# Patient Record
Sex: MALE | Race: WHITE | NOT HISPANIC OR LATINO | Employment: FULL TIME | URBAN - METROPOLITAN AREA
[De-identification: names, ages, dates, MRNs, and addresses within clinical notes are randomized per-mention and may not be internally consistent; named-entity substitution may affect disease eponyms.]

---

## 2023-01-29 ENCOUNTER — HOSPITAL ENCOUNTER (EMERGENCY)
Facility: HOSPITAL | Age: 43
Discharge: HOME | End: 2023-01-29
Attending: EMERGENCY MEDICINE | Admitting: EMERGENCY MEDICINE
Payer: COMMERCIAL

## 2023-01-29 VITALS
RESPIRATION RATE: 18 BRPM | HEART RATE: 82 BPM | TEMPERATURE: 96.8 F | HEIGHT: 72 IN | OXYGEN SATURATION: 97 % | BODY MASS INDEX: 37.11 KG/M2 | SYSTOLIC BLOOD PRESSURE: 128 MMHG | DIASTOLIC BLOOD PRESSURE: 79 MMHG | WEIGHT: 274 LBS

## 2023-01-29 DIAGNOSIS — T15.91XS: ICD-10-CM

## 2023-01-29 PROCEDURE — 99283 EMERGENCY DEPT VISIT LOW MDM: CPT

## 2023-01-29 PROCEDURE — 250N000009 HC RX 250: Performed by: EMERGENCY MEDICINE

## 2023-01-29 RX ORDER — TETRACAINE HYDROCHLORIDE 5 MG/ML
1-2 SOLUTION OPHTHALMIC ONCE
Status: COMPLETED | OUTPATIENT
Start: 2023-01-29 | End: 2023-01-29

## 2023-01-29 RX ORDER — ERYTHROMYCIN 5 MG/G
OINTMENT OPHTHALMIC ONCE
Status: COMPLETED | OUTPATIENT
Start: 2023-01-29 | End: 2023-01-29

## 2023-01-29 RX ADMIN — ERYTHROMYCIN: 5 OINTMENT OPHTHALMIC at 21:46

## 2023-01-29 RX ADMIN — TETRACAINE HYDROCHLORIDE 2 DROP: 5 SOLUTION OPHTHALMIC at 20:37

## 2023-01-29 ASSESSMENT — ACTIVITIES OF DAILY LIVING (ADL): ADLS_ACUITY_SCORE: 35

## 2023-01-30 NOTE — ED PROVIDER NOTES
"EMERGENCY DEPARTMENT ENCOUNTER            IMPRESSION:  Orbital foreign body      MEDICAL DECISION MAKING:  Patient reports right eye foreign body.  He reports low velocity rust residue occurred last night    On exam he is vision is preserved.  He has some small flecks of albaro debris in his right eye.    The eye was anesthetized.  The eye was thoroughly irrigated.    There was still some small debris around the iris.  The eye was again anesthetized and this was removed with a Q-tip.    Erythromycin ointment was instilled in the eye    Patient discharged home      =================================================================  CHIEF COMPLAINT:  Chief Complaint   Patient presents with     Eye Problem     right         HPI  Marlo Miner is a 42 year old male with a history of impulse control disorder, DVT, and foreign body of skin of plantar aspect of foot, who presents to the ED by walk in for evaluation of eye problem.     Yesterday, the patient reports working on his car when some rust fell into his right eye. He was not wearing safety glasses. The patient states that he was able to wash his eyes with water afterwards, but still feels like \"something is there.\" He denies visual changes. Last night, the patient tried looking at his right eye with his iPhone and found \"4 pieces\" after taking a picture. Here in the ED, the patient reports of eye discomfort. He otherwise denies any other injuries or complaints at this time.     I, Vinny Ellis am serving as a scribe to document services personally performed by Dr. Frank Mckeon MD, based on my observation and the provider's statements to me. I, Dr. Frank Mckeon MD attest that Vinny Ellis is acting in a scribe capacity, has observed my performance of the services and has documented them in accordance with my direction.      REVIEW OF SYSTEMS     Eyes: Positive for eye discomfort and foreign body in eye. Denies visual changes or discharge          PAST MEDICAL " HISTORY:  Past Medical History:   Diagnosis Date     S/P laminectomy with spinal fusion 2010       PAST SURGICAL HISTORY:  History reviewed. No pertinent surgical history.      CURRENT MEDICATIONS:    oxyCODONE-acetaminophen (PERCOCET) 5-325 MG per tablet        ALLERGIES:  No Known Allergies    FAMILY HISTORY:  History reviewed. No pertinent family history.    SOCIAL HISTORY:   Social History     Socioeconomic History     Marital status:    Tobacco Use     Smoking status: Heavy Smoker     Packs/day: 0.50     Types: Cigarettes   Substance and Sexual Activity     Alcohol use: No       PHYSICAL EXAM:    BP (!) 145/91   Pulse 108   Temp 96.8  F (36  C) (Temporal)   Resp 18   Ht 1.829 m (6')   Wt 124.3 kg (274 lb)   SpO2 99%   BMI 37.16 kg/m      Constitutional: Awake, alert, in no acute distress    Eyes: Right eye preserved vision.  Pupils are round.  Small amount of rust residue debris.    Psychiatric: Normal mood and affect. Normal judgement.    ED COURSE:    8:08 PM I met with the patient, obtained history, performed an initial exam, and discussed options and plan for diagnostics and treatment here in the ED. PPE worn: surgical mask, gloves   9:11 PM I rechecked and updated the patient.   9:34 PM We discussed the plan for discharge and the patient is agreeable. Reviewed supportive cares, symptomatic treatment, outpatient follow up, and reasons to return to the Emergency Department. Patient to be discharged by ED RN.       Medical Decision Making    History:    Supplemental history from: Family    External Record(s) reviewed: External medical records care everywhere    Work Up:    Chart documentation includes differential considered and any EKGs or imaging independently interpreted by provider.  Laboratory, EKG, radiology independently reviewed    In additional to work up documented, I considered the following work up:    External consultation:    Discussion of management with another provider:  None    Complicating factors:    Care impacted by chronic illness: None     Care affected by social determinants of health: Access to care    Disposition considerations: Patient is stable for discharge given erythromycin ointment             PROCEDURES:   The eye was anesthetized with tetracaine.  Significant irrigation.  Small residue removed with Q-tip            MEDICATIONS GIVEN IN THE EMERGENCY:  Medications   erythromycin (ROMYCIN) ophthalmic ointment (has no administration in time range)   tetracaine (PONTOCAINE) 0.5 % ophthalmic solution 1-2 drop (2 drops Left Eye Given 1/29/23 2037)           NEW PRESCRIPTIONS STARTED AT TODAY'S ER VISIT:  New Prescriptions    No medications on file          Prior to making a final disposition on this patient the results of patient's tests and other diagnostic studies were discussed with the patient. All questions were answered. Patient expressed understanding of the plan and was amenable to it.      FINAL DIAGNOSIS:    ICD-10-CM    1. Eye foreign body, right, sequela  T15.91XS                  NAME: Marlo Miner  AGE: 42 year old male  YOB: 1980  MRN: [id number]  EVALUATION DATE & TIME: 1/29/2023  7:26 PM    PCP: Eugene Zepeda    ED PROVIDER: Frank Mckeon M.D.      Vinny GRAY Cha, am serving as a scribe to document services personally performed by Dr. Frank Mckeon based on my observation and the provider's statements to me. IFrank MD attest that Vinny Ellis is acting in a scribe capacity, has observed my performance of the services and has documented them in accordance with my direction.    Frank Mckeon M.D.  Emergency Medicine  [facility] EMERGENCY DEPARTMENT  [address] [place] [id number]  [phone]  Dept: [phone]  1/29/2023       Frank Mckeon MD  01/30/23 0016

## 2023-01-30 NOTE — DISCHARGE INSTRUCTIONS
Your eyes were irrigated and foreign body removed  Apply the ointment twice a day for 2 days  Eye doctor referral if you have ongoing symptoms  Wears safety glasses

## 2023-01-30 NOTE — ED TRIAGE NOTES
Pt here d/t foreign object in right eye. States he was working on a car yesterday and some rust fell into his eye. States he was able to flush his eye well, but still feels like something is in there.      Triage Assessment     Row Name 01/29/23 1913       Triage Assessment (Adult)    Airway WDL WDL